# Patient Record
Sex: FEMALE | Race: WHITE
[De-identification: names, ages, dates, MRNs, and addresses within clinical notes are randomized per-mention and may not be internally consistent; named-entity substitution may affect disease eponyms.]

---

## 2020-05-29 ENCOUNTER — HOSPITAL ENCOUNTER (EMERGENCY)
Dept: HOSPITAL 61 - ER | Age: 30
LOS: 1 days | Discharge: HOME | End: 2020-05-30
Payer: SELF-PAY

## 2020-05-29 VITALS — BODY MASS INDEX: 24.22 KG/M2 | WEIGHT: 136.69 LBS | HEIGHT: 63 IN

## 2020-05-29 VITALS — DIASTOLIC BLOOD PRESSURE: 63 MMHG | SYSTOLIC BLOOD PRESSURE: 128 MMHG
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
## 2020-05-29 DIAGNOSIS — S21.011A: Primary | ICD-10-CM
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
## 2020-05-29 LAB
ALBUMIN SERPL-MCNC: 4.6 G/DL (ref 3.4–5)
ALP SERPL-CCNC: 46 U/L (ref 46–116)
ALT SERPL-CCNC: 31 U/L (ref 14–59)
ANION GAP SERPL CALC-SCNC: 13 MMOL/L (ref 6–14)
AST SERPL-CCNC: 27 U/L (ref 15–37)
BASOPHILS # BLD AUTO: 0.1 X10^3/UL (ref 0–0.2)
BASOPHILS NFR BLD: 1 % (ref 0–3)
BILIRUB DIRECT SERPL-MCNC: 0.1 MG/DL (ref 0–0.2)
BILIRUB SERPL-MCNC: 0.2 MG/DL (ref 0.2–1)
BUN SERPL-MCNC: 7 MG/DL (ref 7–20)
CALCIUM SERPL-MCNC: 8 MG/DL (ref 8.5–10.1)
CHLORIDE SERPL-SCNC: 108 MMOL/L (ref 98–107)
CO2 SERPL-SCNC: 25 MMOL/L (ref 21–32)
CREAT SERPL-MCNC: 0.8 MG/DL (ref 0.6–1)
EOSINOPHIL NFR BLD: 0.2 X10^3/UL (ref 0–0.7)
EOSINOPHIL NFR BLD: 2 % (ref 0–3)
ERYTHROCYTE [DISTWIDTH] IN BLOOD BY AUTOMATED COUNT: 14.1 % (ref 11.5–14.5)
GFR SERPLBLD BASED ON 1.73 SQ M-ARVRAT: 84.2 ML/MIN
GLUCOSE SERPL-MCNC: 99 MG/DL (ref 70–99)
HCT VFR BLD CALC: 43.5 % (ref 36–47)
HGB BLD-MCNC: 14.6 G/DL (ref 12–15.5)
LYMPHOCYTES # BLD: 3.8 X10^3/UL (ref 1–4.8)
LYMPHOCYTES NFR BLD AUTO: 45 % (ref 24–48)
MAGNESIUM SERPL-MCNC: 2.4 MG/DL (ref 1.8–2.4)
MCH RBC QN AUTO: 29 PG (ref 25–35)
MCHC RBC AUTO-ENTMCNC: 34 G/DL (ref 31–37)
MCV RBC AUTO: 87 FL (ref 79–100)
MONO #: 0.6 X10^3/UL (ref 0–1.1)
MONOCYTES NFR BLD: 7 % (ref 0–9)
NEUT #: 3.8 X10^3/UL (ref 1.8–7.7)
NEUTROPHILS NFR BLD AUTO: 46 % (ref 31–73)
PLATELET # BLD AUTO: 291 X10^3/UL (ref 140–400)
POTASSIUM SERPL-SCNC: 3.6 MMOL/L (ref 3.5–5.1)
PROT SERPL-MCNC: 7.7 G/DL (ref 6.4–8.2)
RBC # BLD AUTO: 4.99 X10^6/UL (ref 3.5–5.4)
SODIUM SERPL-SCNC: 146 MMOL/L (ref 136–145)
WBC # BLD AUTO: 8.4 X10^3/UL (ref 4–11)

## 2020-05-29 PROCEDURE — 36415 COLL VENOUS BLD VENIPUNCTURE: CPT

## 2020-05-29 PROCEDURE — 99284 EMERGENCY DEPT VISIT MOD MDM: CPT

## 2020-05-29 PROCEDURE — 80048 BASIC METABOLIC PNL TOTAL CA: CPT

## 2020-05-29 PROCEDURE — 96374 THER/PROPH/DIAG INJ IV PUSH: CPT

## 2020-05-29 PROCEDURE — 83735 ASSAY OF MAGNESIUM: CPT

## 2020-05-29 PROCEDURE — 12034 INTMD RPR S/TR/EXT 7.6-12.5: CPT

## 2020-05-29 PROCEDURE — 80076 HEPATIC FUNCTION PANEL: CPT

## 2020-05-29 PROCEDURE — 85025 COMPLETE CBC W/AUTO DIFF WBC: CPT
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
## 2020-06-08 ENCOUNTER — HOSPITAL ENCOUNTER (EMERGENCY)
Dept: HOSPITAL 61 - ER | Age: 30
Discharge: HOME | End: 2020-06-08
Payer: SELF-PAY

## 2020-06-08 VITALS — DIASTOLIC BLOOD PRESSURE: 60 MMHG | SYSTOLIC BLOOD PRESSURE: 122 MMHG

## 2020-06-08 VITALS — WEIGHT: 125.22 LBS | HEIGHT: 62 IN | BODY MASS INDEX: 23.04 KG/M2

## 2020-06-08 DIAGNOSIS — F17.200: ICD-10-CM

## 2020-06-08 DIAGNOSIS — S21.011D: Primary | ICD-10-CM
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
## 2020-06-08 PROCEDURE — 99282 EMERGENCY DEPT VISIT SF MDM: CPT
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
## 2021-11-07 ENCOUNTER — HOSPITAL ENCOUNTER (OUTPATIENT)
Age: 31
Discharge: HOME | End: 2021-11-07
Payer: COMMERCIAL

## 2021-11-07 VITALS
HEIGHT: 64 IN | RESPIRATION RATE: 20 BRPM | OXYGEN SATURATION: 100 % | DIASTOLIC BLOOD PRESSURE: 58 MMHG | BODY MASS INDEX: 23.9 KG/M2 | HEART RATE: 85 BPM | SYSTOLIC BLOOD PRESSURE: 109 MMHG | TEMPERATURE: 98 F | WEIGHT: 140 LBS

## 2021-11-07 DIAGNOSIS — R09.81 NASAL CONGESTION: Primary | ICD-10-CM

## 2021-11-07 DIAGNOSIS — J02.9 SORE THROAT: ICD-10-CM

## 2021-11-07 PROCEDURE — U0002 COVID-19 LAB TEST NON-CDC: HCPCS | Performed by: PHYSICIAN ASSISTANT

## 2021-11-07 PROCEDURE — 99213 OFFICE O/P EST LOW 20 MIN: CPT | Performed by: PHYSICIAN ASSISTANT

## 2021-11-07 PROCEDURE — 87880 STREP A ASSAY W/OPTIC: CPT | Performed by: PHYSICIAN ASSISTANT

## 2021-11-07 NOTE — ED PROVIDER NOTES
Patient Seen in: [facility]      History   Patient presents with:  Cough/URI    Stated Complaint: COLD SYMPTOMS, SORE THROAT X 3 DAYS    Subjective:   HPI    26 yo female here for evaluation of sore throat, URI symptoms for the last 3 days. Cervical back: Normal range of motion. Skin:     General: Skin is warm. Neurological:      General: No focal deficit present. Mental Status: She is alert and oriented to person, place, and time.    Psychiatric:         Mood and Affect: Mood no

## 2025-03-24 ENCOUNTER — HOSPITAL ENCOUNTER (OUTPATIENT)
Age: 35
Discharge: HOME | End: 2025-03-24
Payer: COMMERCIAL

## 2025-03-24 VITALS
SYSTOLIC BLOOD PRESSURE: 121 MMHG | DIASTOLIC BLOOD PRESSURE: 82 MMHG | RESPIRATION RATE: 18 BRPM | HEART RATE: 65 BPM | OXYGEN SATURATION: 100 % | TEMPERATURE: 98 F

## 2025-03-24 DIAGNOSIS — R59.0 OCCIPITAL LYMPHADENOPATHY: Primary | ICD-10-CM

## 2025-03-24 LAB
#MXD IC: 0.3 X10ˆ3/UL (ref 0.1–1)
HCT VFR BLD AUTO: 38.8 %
HGB BLD-MCNC: 12.6 G/DL
LYMPHOCYTES # BLD AUTO: 2.5 X10ˆ3/UL (ref 1–4)
LYMPHOCYTES NFR BLD AUTO: 46 %
MCH RBC QN AUTO: 29 PG (ref 26–34)
MCHC RBC AUTO-ENTMCNC: 32.5 G/DL (ref 31–37)
MCV RBC AUTO: 89.4 FL (ref 80–100)
MIXED CELL %: 5.9 %
NEUTROPHILS # BLD AUTO: 2.6 X10ˆ3/UL (ref 1.5–7.7)
NEUTROPHILS NFR BLD AUTO: 48.1 %
PLATELET # BLD AUTO: 215 X10ˆ3/UL (ref 150–450)
RBC # BLD AUTO: 4.34 X10ˆ6/UL
WBC # BLD AUTO: 5.4 X10ˆ3/UL (ref 4–11)

## 2025-03-24 PROCEDURE — 85025 COMPLETE CBC W/AUTO DIFF WBC: CPT | Performed by: NURSE PRACTITIONER

## 2025-03-24 PROCEDURE — 99202 OFFICE O/P NEW SF 15 MIN: CPT | Performed by: NURSE PRACTITIONER

## 2025-03-24 RX ORDER — SERTRALINE HCL 50 MG
1 TABLET ORAL DAILY
COMMUNITY

## 2025-03-24 NOTE — ED PROVIDER NOTES
Patient Seen in: [facility]      History     Chief Complaint   Patient presents with    Lump     Stated Complaint: Lump on Head    Subjective:   35 y/o female with unremarkable medical history presents with c/o painful lump to left posterior scalp x 5 days. Pain when pressing area. Endorses has a cold prior to swelling. No injury/trauma, headache, drainage, increase in size, fever/chills, neck pain or stiffness.               Objective:     History reviewed. No pertinent past medical history.           Past Surgical History:   Procedure Laterality Date    Anesthesia for ear surgery Right     Dilation/curettage,diagnostic                  Social History     Socioeconomic History    Marital status:    Tobacco Use    Smoking status: Never    Smokeless tobacco: Never   Vaping Use    Vaping status: Never Used   Substance and Sexual Activity    Alcohol use: Yes     Comment: occ    Drug use: Never     Social Drivers of Health     Food Insecurity: Low Risk  (9/14/2023)    Received from [facility]    Food Insecurity     Have there been times that your food ran out, and you didn't have money to get more?: No     Are there times that you worry that this might happen?: No   Transportation Needs: Low Risk  (9/14/2023)    Received from [facility]    Transportation Needs     Do you have trouble getting transportation to medical appointments?: No              Review of Systems   Constitutional:  Negative for chills and fever.   HENT:  Negative for ear pain and sore throat.    Cardiovascular:  Negative for chest pain.   Musculoskeletal:  Negative for neck pain and neck stiffness.   Neurological:  Negative for dizziness, light-headedness, numbness and headaches.   All other systems reviewed and are negative.      Positive for stated complaint: Lump on Head  Other systems are as noted in HPI.  Constitutional and vital signs reviewed.      All other systems reviewed and  negative except as noted above.    Physical Exam     ED Triage Vitals   BP 03/24/25 1718 121/82   Pulse 03/24/25 1718 65   Resp 03/24/25 1718 18   Temp 03/24/25 1718 97.9 °F (36.6 °C)   Temp src 03/24/25 1718 Oral   SpO2 03/24/25 1718 100 %   O2 Device 03/24/25 1716 None (Room air)       Current Vitals:   Vital Signs  BP: 121/82  Pulse: 65  Resp: 18  Temp: 97.9 °F (36.6 °C)  Temp src: Oral    Oxygen Therapy  SpO2: 100 %  O2 Device: None (Room air)        Physical Exam  Vitals and nursing note reviewed.   Constitutional:       General: She is not in acute distress.     Appearance: Normal appearance. She is not ill-appearing or toxic-appearing.   HENT:      Head: Normocephalic.        Right Ear: Tympanic membrane and external ear normal.      Left Ear: Tympanic membrane and external ear normal.      Mouth/Throat:      Mouth: Mucous membranes are moist.   Cardiovascular:      Rate and Rhythm: Normal rate and regular rhythm.   Pulmonary:      Effort: Pulmonary effort is normal.      Breath sounds: Normal breath sounds.   Musculoskeletal:         General: Normal range of motion.      Cervical back: Normal range of motion and neck supple.   Lymphadenopathy:      Cervical: No cervical adenopathy.   Skin:     General: Skin is warm and dry.      Capillary Refill: Capillary refill takes less than 2 seconds.   Neurological:      General: No focal deficit present.      Mental Status: She is alert and oriented to person, place, and time.      GCS: GCS eye subscore is 4. GCS verbal subscore is 5. GCS motor subscore is 6.   Psychiatric:         Behavior: Behavior is cooperative.             ED Course     Labs Reviewed   POCT CBC                   MDM              Medical Decision Making  Patient is well-appearing. In NAD  I discussed differentials with patient including but not limited to abscess, lymphadenopathy (viral vs bacteria)  Will check cbc  No imaging needed at this time  Warm compresses to area   otc meds prn  Close Fu  with PCP. ED precautions discussed  Discussed with HELEN Daniels Attending      Problems Addressed:  Occipital lymphadenopathy: acute illness or injury    Amount and/or Complexity of Data Reviewed  Labs: ordered. Decision-making details documented in ED Course.    Risk  OTC drugs.        Disposition and Plan     Clinical Impression:  1. Occipital lymphadenopathy         Disposition:  Discharge  3/24/2025  6:04 pm    Follow-up:  Sang Tello MD  [address] [place] [address] [id number] [phone]      or follow up with your Primary Doctor          Medications Prescribed:  Discharge Medication List as of 3/24/2025  6:07 PM              Supplementary Documentation:

## 2025-03-24 NOTE — ED INITIAL ASSESSMENT (HPI)
Pt c/o painful lump to L posterior scalp x5 days.  No drainage.  No fever.  No injury.  States had a cold last week.